# Patient Record
Sex: MALE | Race: BLACK OR AFRICAN AMERICAN
[De-identification: names, ages, dates, MRNs, and addresses within clinical notes are randomized per-mention and may not be internally consistent; named-entity substitution may affect disease eponyms.]

---

## 2017-10-07 NOTE — UC
UC General HPI





- HPI Summary


HPI Summary: 





36 YEAR OLD MALE PRESENTS FOR A REFILE ON HIS PSYCH MEDICATIONS. I WILL SEND 

HIM TO THE ER. 





- History of Current Complaint


Stated Complaint: MED REFILL


Time Seen by Provider: 10/07/17 11:39


Hx Obtained From: Patient


Onset/Duration: Sudden Onset


Timing: Constant


Onset Severity: Moderate


Current Severity: Moderate





- Allergy/Home Medications


Allergies/Adverse Reactions: 


 Allergies











Allergy/AdvReac Type Severity Reaction Status Date / Time


 


No Known Allergies Allergy   Verified 09/23/17 08:58














PMH/Surg Hx/FS Hx/Imm Hx





- Surgical History


Surgical History: None





- Family History


Known Family History: Positive: Hypertension


   Negative: Cardiac Disease, Diabetes





- Social History


Alcohol Use: Occasionally


Substance Use Type: None


Smoking Status (MU): Heavy Every Day Tobacco Smoker


Amount Used/How Often: 1/2 ppd





Review of Systems


Constitutional: Negative


Skin: Negative


Eyes: Negative


ENT: Negative


Respiratory: Negative


Cardiovascular: Negative


Gastrointestinal: Negative


Genitourinary: Negative


Motor: Negative


Neurovascular: Negative


Musculoskeletal: Negative


Neurological: Negative


Psychological: Anxious, Depressed


All Other Systems Reviewed And Are Negative: Yes





Physical Exam


Triage Information Reviewed: Yes


Appearance: Well-Appearing


Vital Signs Reviewed: Yes


Eye Exam: Normal


ENT Exam: Normal


Dental Exam: Normal


Neck exam: Normal


Neck: Positive: 1


Respiratory Exam: Normal


Cardiovascular Exam: Normal


Abdominal Exam: Normal


Musculoskeletal Exam: Normal


Neurological Exam: Normal


Psychological Exam: Normal


Skin Exam: Normal





Course/Dx





- Differential Dx - Multi-Symptom


Provider Diagnoses: PSYCHIATRIC EMOTIONAL ISSUES





Discharge





- Discharge Plan


Condition: Stable


Disposition: HOME


Patient Education Materials:  Medicine Refill (ED)


Referrals: 


No Primary Care Phys,NOPCP [Primary Care Provider] - 


Additional Instructions: 


PLEASE GO TO ER TO REFILL PSYCHIATRIC MEDICATION.

## 2017-10-08 NOTE — ED
Psychiatric Complaint





- HPI Summary


HPI Summary: 





patient presents for medication refill, recently moved to [facility], patient 

was followed in [place] prior, states has an appointment with [facility] 

on thurs, but will run out of medications prior to this, was filled by Dr. Awan at  2 weeks ago, however patient was unable to be seen by  until 

next week.  Denies SI/ HI, has been on current medications for several years.  

  reports recent deakote level  H/O schitzoeffective disorder.   





- History Of Current Complaint


Chief Complaint: EDPrescriptionNeeded


Time Seen by Provider: 10/07/17 17:48


Hx Obtained From: Patient


Onset/Duration: Resolved - with medications


Alleviating Factor(s): Medication





- Allergies/Home Medications


Allergies/Adverse Reactions: 


 Allergies











Allergy/AdvReac Type Severity Reaction Status Date / Time


 


No Known Allergies Allergy   Verified 10/07/17 13:40














PMH/Surg Hx/FS Hx/Imm Hx


Previously Healthy: No - schitzoeffective 


Endocrine/Hematology History: 


   Denies: Hx Diabetes, Hx Thyroid Disease


Cardiovascular History: 


   Denies: Hx Hypertension


Respiratory History: 


   Denies: Hx Asthma, Hx Chronic Obstructive Pulmonary Disease (COPD)


GI History: 


   Denies: Hx Ulcer


Infectious Disease History: No


Infectious Disease History: 


   Denies: Hx Hepatitis, Hx Human Immunodeficiency Virus (HIV), History Other 

Infectious Disease, Traveled Outside the [place] in Last 30 Days





- Family History


Known Family History: Positive: Hypertension


   Negative: Cardiac Disease, Diabetes





- Social History


Alcohol Use: Occasionally


Substance Use Type: Reports: None


Smoking Status (MU): Heavy Every Day Tobacco Smoker


Amount Used/How Often: 1/2 ppd


Have You Smoked in the Last Year: Yes





Review of Systems


Psychological: Normal - controlled on medication 


All Other Systems Reviewed And Are Negative: Yes





Physical Exam


Vital Signs On Initial Exam: 


 Initial Vitals











Temp Pulse Resp BP Pulse Ox


 


 98.1 F   76   20   105/61   100 


 


 10/07/17 13:25  10/07/17 13:25  10/07/17 13:25  10/07/17 13:25  10/07/17 13:25











Vital Signs Reviewed: Yes


Appearance: Positive: Well-Appearing, No Pain Distress, Well-Nourished


Skin: Positive: Warm, Skin Color Reflects Adequate Perfusion


Head/Face: Positive: Normal Head/Face Inspection


Eyes: Positive: Normal, EOMI


Respiratory/Lung Sounds: Positive: Clear to Auscultation, Breath Sounds Present


Cardiovascular: Positive: Normal, RRR, Pulses are Symmetrical in both Upper and 

Lower Extremities, S1, S2


Abdomen Description: Positive: Nontender, No Organomegaly





- Arthur Coma Scale


Coma Scale Total: 15





Diagnostics





- Vital Signs


 Vital Signs











  Temp Pulse Resp BP Pulse Ox


 


 10/07/17 18:10  98.5 F  59  19  109/70  98


 


 10/07/17 17:00  98.7 F  61  18  103/58  100


 


 10/07/17 15:09  98.2 F  74  20  107/62  97


 


 10/07/17 13:25  98.1 F  76  20  105/61  100














- Laboratory


Lab Results: 


 Lab Results











  10/07/17 Range/Units





  18:02 


 


Valproic Acid  73.0  ()  mcg/mL











Lab Statement: Any lab studies that have been ordered have been reviewed, and 

results considered in the medical decision making process.





Course/Dx





- Course


Course Of Treatment: discussed with patient that we will no longer be able to 

fill his  medications, referral to PCP given, follow up with [facility] for 

presctriptions.  patient in agreement





- Differential Dx/Clinical Impression


Differential Diagnosis/HQI/PQRI: Positive: Other - med refill, no complaints


Provider Diagnosis: 


 Medication refill, Schizo affective schizophrenia








Discharge





- Discharge Plan


Condition: Good


Disposition: HOME


Prescriptions: 


Divalproex DR TAB(*) [Depakote DR TAB(*)] 500 mg PO BID #28 tab.


risperiDONE TAB* [Risperdal*] 1 mg PO DAILY #14 tab


risperiDONE TAB* [Risperdal*] 2 mg PO BEDTIME #14 tab


Patient Education Materials:  Risperidone (By mouth), Valproic Acid (By mouth)


Referrals: 


No Primary Care Phys,NOPCP [Primary Care Provider] - 


Additional Instructions: 


- Please follow up with [facility] on THurs for you appointment.  

We will NOT be able to refill any other medications for you 





- REturn to ER with thoughts of wanting to hurt yourself, others, increased 

pain. lightheadedness, dizziness or shortness of breath

## 2018-01-11 ENCOUNTER — HOSPITAL ENCOUNTER (EMERGENCY)
Dept: HOSPITAL 25 - ED | Age: 38
LOS: 1 days | Discharge: HOME | End: 2018-01-12
Payer: MEDICAID

## 2018-01-11 DIAGNOSIS — R07.9: ICD-10-CM

## 2018-01-11 DIAGNOSIS — F32.9: ICD-10-CM

## 2018-01-11 DIAGNOSIS — F17.210: ICD-10-CM

## 2018-01-11 DIAGNOSIS — F41.9: Primary | ICD-10-CM

## 2018-01-11 PROCEDURE — 80053 COMPREHEN METABOLIC PANEL: CPT

## 2018-01-11 PROCEDURE — 36415 COLL VENOUS BLD VENIPUNCTURE: CPT

## 2018-01-11 PROCEDURE — 83605 ASSAY OF LACTIC ACID: CPT

## 2018-01-11 PROCEDURE — 85027 COMPLETE CBC AUTOMATED: CPT

## 2018-01-11 PROCEDURE — 99283 EMERGENCY DEPT VISIT LOW MDM: CPT

## 2018-01-11 PROCEDURE — 71046 X-RAY EXAM CHEST 2 VIEWS: CPT

## 2018-01-11 PROCEDURE — 84484 ASSAY OF TROPONIN QUANT: CPT

## 2018-01-11 PROCEDURE — 93005 ELECTROCARDIOGRAM TRACING: CPT

## 2018-01-12 VITALS — SYSTOLIC BLOOD PRESSURE: 166 MMHG | DIASTOLIC BLOOD PRESSURE: 69 MMHG

## 2018-01-12 LAB
HCT VFR BLD AUTO: 43 % (ref 42–52)
HGB BLD-MCNC: 14.5 G/DL (ref 14–18)
MCH RBC QN AUTO: 32 PG (ref 27–31)
MCHC RBC AUTO-ENTMCNC: 34 G/DL (ref 31–36)
MCV RBC AUTO: 93 FL (ref 80–94)
PLATELET # BLD AUTO: 194 10^3/UL (ref 150–450)
RBC # BLD AUTO: 4.57 10^6/UL (ref 4–5.4)
WBC # BLD AUTO: 5.8 10^3/UL (ref 3.5–10.8)
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
## 2018-10-03 ENCOUNTER — HOSPITAL ENCOUNTER (INPATIENT)
Dept: HOSPITAL 25 - ED | Age: 38
LOS: 7 days | Discharge: HOME | DRG: 750 | End: 2018-10-10
Attending: PSYCHIATRY & NEUROLOGY | Admitting: PSYCHIATRY & NEUROLOGY
Payer: MEDICAID

## 2018-10-03 DIAGNOSIS — F41.9: ICD-10-CM

## 2018-10-03 DIAGNOSIS — F22: ICD-10-CM
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
## 2018-10-03 DIAGNOSIS — F39: ICD-10-CM

## 2018-10-03 DIAGNOSIS — R45.851: ICD-10-CM

## 2018-10-03 DIAGNOSIS — F25.8: Primary | ICD-10-CM

## 2018-10-03 DIAGNOSIS — F32.9: ICD-10-CM

## 2018-10-03 DIAGNOSIS — Z82.49: ICD-10-CM

## 2018-10-03 DIAGNOSIS — G25.9: ICD-10-CM

## 2018-10-03 DIAGNOSIS — Z81.8: ICD-10-CM

## 2018-10-03 DIAGNOSIS — F10.10: ICD-10-CM

## 2018-10-03 DIAGNOSIS — F14.10: ICD-10-CM

## 2018-10-03 DIAGNOSIS — F17.200: ICD-10-CM

## 2018-10-03 DIAGNOSIS — Z56.0: ICD-10-CM

## 2018-10-03 LAB
BASOPHILS # BLD AUTO: 0.1 10^3/UL (ref 0–0.2)
EOSINOPHIL # BLD AUTO: 0.1 10^3/UL (ref 0–0.6)
HCT VFR BLD AUTO: 45 % (ref 42–52)
HGB BLD-MCNC: 15.1 G/DL (ref 14–18)
LYMPHOCYTES # BLD AUTO: 1.9 10^3/UL (ref 1–4.8)
MCH RBC QN AUTO: 32 PG (ref 27–31)
MCHC RBC AUTO-ENTMCNC: 34 G/DL (ref 31–36)
MCV RBC AUTO: 94 FL (ref 80–94)
MONOCYTES # BLD AUTO: 1.1 10^3/UL (ref 0–0.8)
NEUTROPHILS # BLD AUTO: 8.6 10^3/UL (ref 1.5–7.7)
NRBC # BLD AUTO: 0 10^3/UL
NRBC BLD QL AUTO: 0.2
PLATELET # BLD AUTO: 224 10^3/UL (ref 150–450)
RBC # BLD AUTO: 4.8 10^6/UL (ref 4–5.4)
RBC UR QL AUTO: (no result)
WBC # BLD AUTO: 11.7 10^3/UL (ref 3.5–10.8)
WBC UR QL AUTO: (no result)

## 2018-10-03 PROCEDURE — 87086 URINE CULTURE/COLONY COUNT: CPT

## 2018-10-03 PROCEDURE — 99238 HOSP IP/OBS DSCHRG MGMT 30/<: CPT

## 2018-10-03 PROCEDURE — 80053 COMPREHEN METABOLIC PANEL: CPT

## 2018-10-03 PROCEDURE — 80061 LIPID PANEL: CPT

## 2018-10-03 PROCEDURE — 85025 COMPLETE CBC W/AUTO DIFF WBC: CPT

## 2018-10-03 PROCEDURE — 36415 COLL VENOUS BLD VENIPUNCTURE: CPT

## 2018-10-03 PROCEDURE — 81015 MICROSCOPIC EXAM OF URINE: CPT

## 2018-10-03 PROCEDURE — 80164 ASSAY DIPROPYLACETIC ACD TOT: CPT

## 2018-10-03 PROCEDURE — 81003 URINALYSIS AUTO W/O SCOPE: CPT

## 2018-10-03 PROCEDURE — 80320 DRUG SCREEN QUANTALCOHOLS: CPT

## 2018-10-03 PROCEDURE — 84443 ASSAY THYROID STIM HORMONE: CPT

## 2018-10-03 PROCEDURE — 80329 ANALGESICS NON-OPIOID 1 OR 2: CPT

## 2018-10-03 PROCEDURE — G0480 DRUG TEST DEF 1-7 CLASSES: HCPCS

## 2018-10-03 PROCEDURE — 99222 1ST HOSP IP/OBS MODERATE 55: CPT

## 2018-10-03 PROCEDURE — 99283 EMERGENCY DEPT VISIT LOW MDM: CPT

## 2018-10-03 PROCEDURE — 80307 DRUG TEST PRSMV CHEM ANLYZR: CPT

## 2018-10-03 PROCEDURE — 83036 HEMOGLOBIN GLYCOSYLATED A1C: CPT

## 2018-10-03 PROCEDURE — 99231 SBSQ HOSP IP/OBS SF/LOW 25: CPT

## 2018-10-03 RX ADMIN — RISPERIDONE SCH MG: 2 TABLET ORAL at 21:13

## 2018-10-03 RX ADMIN — DIVALPROEX SODIUM SCH MG: 500 TABLET, DELAYED RELEASE ORAL at 21:13

## 2018-10-03 SDOH — ECONOMIC STABILITY - INCOME SECURITY: UNEMPLOYMENT, UNSPECIFIED: Z56.0

## 2018-10-03 NOTE — ED
Progress





- Progress Note


Progress Note: 


Receiving sign out from Dr. Mark. Patient is still pending transfer. He was 

moved to the FLEX unit. Signing out to Dr. Mark, pending transfer.








Course/Dx





- Diagnoses


Provider Diagnoses: 


 Substance-induced psychotic disorder








Discharge





- Sign-Out/Discharge


Documenting (check all that apply): Sign-Out Patient, Receiving Sign-Out


Signing out patient TO: Edmundo Mark


Receiving patient FROM: Edmundo Mark





- Discharge Plan


Referrals: 


No Primary Care Phys,NOPCP [Primary Care Provider] - 





- Attestation Statements


Document Initiated by Scribe: Yes


Documenting Scribe: Amy Ricci


Provider For Whom Scribe is Documenting (Include Credential): Brooks Mar MD


Scribe Attestation: 


Amy BAZAN, scribed for Brooks Mar MD on 10/03/18 at 1835.

## 2018-10-03 NOTE — ED
Progress





- Progress Note


Progress Note: 


Receiving sign out from Dr. Mar. Patient is still pending transfer. 





Patient will be signed out to Dr. Mar, pending transfer





- Consult/PCP


Time Called: 04:55





Course/Dx





- Provider Notifications


Time Discussed With Above Provider: 05:56


Reason For Transfer: No beds available.





Discharge





- Sign-Out/Discharge


Documenting (check all that apply): Sign-Out Patient, Receiving Sign-Out


Signing out patient TO: Brooks Mar


Receiving patient FROM: Brooks Mar





- Discharge Plan


Referrals: 


No Primary Care Phys,NOPCP [Primary Care Provider] - 





- Attestation Statements


Document Initiated by Scribe: Yes


Documenting Scribe: Ramon Garcia


Provider For Whom Scribe is Documenting (Include Credential): Edmundo Mark MD


Scribe Attestation: 


Ramon BAZAN, scribed for Edmundo Mark MD on 10/04/18 at 0646.

## 2018-10-03 NOTE — PN
ED Flex Patient Progress Note


Date of Service: 10/03/18


Subjective:  





ED Flex Day #1 for this 37 y.o. AA male with a history of polysubstance misuse (

cocaine, cannabis, ETOH) arrives from [place] as itinerant with multiple 

packed suitcases, seeking psychiatric hospitalization for disorganized 

behavior.  UDS positive for above substances.





Objective: 





young, AA male in patient scrubs; symptoms of thought disorganization seem 

feigned/exaggerated 





Assessment:


 


Cocaine Use Disorder





Plan: 





Patient seems to be embellishing symptoms.  Will hold overnight in the Flex and 

re-eval in the AM once more detoxified.  Renew Depakote and risperidone per 

outpatient regimen.


 Vital Signs











Temp Pulse Resp BP Pulse Ox


 


 99.2 F   73   15   90/60   100 


 


 10/03/18 10:00  10/03/18 10:00  10/03/18 10:00  10/03/18 10:00  10/03/18 10:00











 Lab Results - Entire Visit











  10/03/18 10/03/18 10/03/18





  01:48 01:48 01:21


 


WBC   


 


RBC   


 


Hgb   


 


Hct   


 


MCV   


 


MCH   


 


MCHC   


 


RDW   


 


Plt Count   


 


MPV   


 


Neut % (Auto)   


 


Lymph % (Auto)   


 


Mono % (Auto)   


 


Eos % (Auto)   


 


Baso % (Auto)   


 


Absolute Neuts (auto)   


 


Absolute Lymphs (auto)   


 


Absolute Monos (auto)   


 


Absolute Eos (auto)   


 


Absolute Basos (auto)   


 


Absolute Nucleated RBC   


 


Nucleated RBC %   


 


Sodium    138


 


Potassium    3.7


 


Chloride    100 L


 


Carbon Dioxide    27


 


Anion Gap    11


 


BUN    14


 


Creatinine    1.34 H


 


Est GFR ( Amer)    73.4


 


Est GFR (Non-Af Amer)    60.7


 


BUN/Creatinine Ratio    10.4


 


Glucose    58 L


 


Calcium    9.4


 


Total Bilirubin    0.50


 


AST    45 H


 


ALT    31


 


Alkaline Phosphatase    61


 


Total Protein    7.8


 


Albumin    4.7


 


Globulin    3.1


 


Albumin/Globulin Ratio    1.5


 


TSH    0.49


 


Urine Color   Yellow 


 


Urine Appearance   Cloudy 


 


Urine pH   5.0 


 


Ur Specific Gravity   1.015 


 


Urine Protein   Negative 


 


Urine Ketones   Negative 


 


Urine Blood   1+ A 


 


Urine Nitrate   Negative 


 


Urine Bilirubin   Negative 


 


Urine Urobilinogen   Negative 


 


Ur Leukocyte Esterase   Negative 


 


Urine WBC (Auto)   Trace(0-5/hpf) 


 


Urine RBC (Auto)   Trace(0-2/hpf) 


 


Ur Squamous Epith Cells   Present A 


 


Urine Bacteria   Absent 


 


Urine Glucose   Negative 


 


Salicylates    < 2.50


 


Urine Opiates Screen  None detected  


 


Acetaminophen    < 15


 


Ur Barbiturates Screen  None detected  


 


Valproic Acid    < 13.0 L


 


Ur Phencyclidine Scrn  None detected  


 


Ur Amphetamines Screen  None detected  


 


U Benzodiazepines Scrn  None detected  


 


Urine Cocaine Screen  Presumptive positive A  


 


U Cannabinoids Screen  Presumptive positive A  


 


Serum Alcohol    157 H














  10/03/18





  01:21


 


WBC  11.7 H


 


RBC  4.80


 


Hgb  15.1


 


Hct  45


 


MCV  94


 


MCH  32 H


 


MCHC  34


 


RDW  14


 


Plt Count  224


 


MPV  8.7


 


Neut % (Auto)  73.6


 


Lymph % (Auto)  16.3 L


 


Mono % (Auto)  9.0 H


 


Eos % (Auto)  0.6


 


Baso % (Auto)  0.5


 


Absolute Neuts (auto)  8.6 H


 


Absolute Lymphs (auto)  1.9


 


Absolute Monos (auto)  1.1 H


 


Absolute Eos (auto)  0.1


 


Absolute Basos (auto)  0.1


 


Absolute Nucleated RBC  0


 


Nucleated RBC %  0.2


 


Sodium 


 


Potassium 


 


Chloride 


 


Carbon Dioxide 


 


Anion Gap 


 


BUN 


 


Creatinine 


 


Est GFR ( Amer) 


 


Est GFR (Non-Af Amer) 


 


BUN/Creatinine Ratio 


 


Glucose 


 


Calcium 


 


Total Bilirubin 


 


AST 


 


ALT 


 


Alkaline Phosphatase 


 


Total Protein 


 


Albumin 


 


Globulin 


 


Albumin/Globulin Ratio 


 


TSH 


 


Urine Color 


 


Urine Appearance 


 


Urine pH 


 


Ur Specific Gravity 


 


Urine Protein 


 


Urine Ketones 


 


Urine Blood 


 


Urine Nitrate 


 


Urine Bilirubin 


 


Urine Urobilinogen 


 


Ur Leukocyte Esterase 


 


Urine WBC (Auto) 


 


Urine RBC (Auto) 


 


Ur Squamous Epith Cells 


 


Urine Bacteria 


 


Urine Glucose 


 


Salicylates 


 


Urine Opiates Screen 


 


Acetaminophen 


 


Ur Barbiturates Screen 


 


Valproic Acid 


 


Ur Phencyclidine Scrn 


 


Ur Amphetamines Screen 


 


U Benzodiazepines Scrn 


 


Urine Cocaine Screen 


 


U Cannabinoids Screen 


 


Serum Alcohol

## 2018-10-03 NOTE — ED
Psychiatric Complaint





- HPI Summary


HPI Summary: 


This patient is a 37 year old M presenting to [facility] with a chief complaint 

of SI with no plan. He endorses paranoia, feeling unsafe outside, and using 

crack cocaine. Rx Depakote, risperidone, and sertraline. Pt states wants tx. Pt 

notes he called the police himself due to feeling unsafe.





- History Of Current Complaint


Chief Complaint: EDMentalHealth


Time Seen by Provider: 10/03/18 00:46


Hx Obtained From: Patient


Onset/Duration: Sudden Onset, Still Present


Timing: Constant


Severity Initially: Moderate


Severity Currently: Moderate


Character: Depressed, Fearful, Anxious


Aggravating Factor(s): Drug Use - crack cocaine


Alleviating Factor(s): Nothing


Associated Signs And Symptoms: Positive: Paranoid Behavior


Related History: Positive For: Prior Psychiatric Issues


Has Suicidal: Reports: Thoughts.  Denies: With A Plan


Has Homicidal: Denies: Thoughts


Ingestion History: Type/Name Of Drug - crack





- Allergies/Home Medications


Allergies/Adverse Reactions: 


 Allergies











Allergy/AdvReac Type Severity Reaction Status Date / Time


 


No Known Allergies Allergy   Verified 10/07/17 13:40














PMH/Surg Hx/FS Hx/Imm Hx


Endocrine/Hematology History: 


   Denies: Hx Diabetes, Hx Thyroid Disease


Cardiovascular History: 


   Denies: Hx Hypertension


Respiratory History: 


   Denies: Hx Asthma, Hx Chronic Obstructive Pulmonary Disease (COPD)


GI History: 


   Denies: Hx Ulcer


 History: 


   Denies: Hx Dialysis


Musculoskeletal History: 


   Denies: Hx Osteoporosis


Sensory History: 


   Denies: Hx Legally Blind, Hx Deafness


Opthamlomology History: 


   Denies: Hx Legally Blind


EENT History: 


   Denies: Hx Deafness


Psychiatric History: Reports: Hx Anxiety, Hx Depression


Infectious Disease History: No


Infectious Disease History: 


   Denies: Hx Hepatitis, Hx Human Immunodeficiency Virus (HIV), History Other 

Infectious Disease, Traveled Outside the [place] in Last 30 Days





- Family History


Known Family History: Positive: Hypertension


   Negative: Cardiac Disease, Diabetes





- Social History


Alcohol Use: Occasionally


Hx Substance Use: No


Substance Use Type: Reports: None


Hx Tobacco Use: Yes


Smoking Status (MU): Heavy Every Day Tobacco Smoker


Amount Used/How Often: 1/2 ppd


Have You Smoked in the Last Year: Yes





Review of Systems


Negative: Fever


Positive: no symptoms reported


Positive: Other - paranoia, SI


All Other Systems Reviewed And Are Negative: Yes





Physical Exam





- Summary


Physical Exam Summary: 


VITAL SIGNS: Reviewed.


GENERAL:  Patient is a well-developed and nourished male who is lying 

comfortable in the stretcher. Patient is not in any acute respiratory distress.


HEAD AND FACE: No signs of trauma. No ecchymosis, hematomas or skull 

depressions. No sinus tenderness.


EYES: PERRLA, EOMI x 2, No injected conjunctiva, no nystagmus.


EARS: Hearing grossly intact. Ear canals and tympanic membranes are within 

normal limits.


MOUTH: Oropharynx within normal limits.


NECK: Supple, trachea is midline, no adenopathy, no JVD, no carotid bruit, no c-

spine tenderness, neck with full ROM.


CHEST: Symmetric, no tenderness at palpation


LUNGS: Clear to auscultation bilaterally. No wheezing or crackles.


CVS: Regular rate and rhythm, S1 and S2 present, no murmurs or gallops 

appreciated.


ABDOMEN: Soft, non-tender. No signs of distention. No rebound no guarding, and 

no masses palpated. Bowel sounds are normal.


EXTREMITIES: FROM in all major joints, no edema, no cyanosis or clubbing.


NEURO: Alert and oriented x 3. No acute neurological deficits. Speech is normal 

and follows commands.


SKIN: Dry and warm


PSYCH: Talkative, impulsive, paranoid, endorses SI with no plan, wants drug tx.








Triage Information Reviewed: Yes


Vital Signs On Initial Exam: 


 Initial Vitals











Temp Pulse Resp BP Pulse Ox


 


 97.9 F   90   18   108/68   97 


 


 10/03/18 00:17  10/03/18 00:17  10/03/18 00:17  10/03/18 00:17  10/03/18 00:17











Vital Signs Reviewed: Yes





Diagnostics





- Vital Signs


 Vital Signs











  Temp Pulse Resp BP Pulse Ox


 


 10/03/18 00:17  97.9 F  90  18  108/68  97














- Laboratory


Result Diagrams: 


 10/03/18 01:21





 10/03/18 01:21


Lab Statement: Any lab studies that have been ordered have been reviewed, and 

results considered in the medical decision making process.





Course/Dx





- Course


Course Of Treatment: A 37-year-old M presents to the ED with a CC of SI with no 

plan. (+) paranoia, fear for his well-being. Rx sertraline, depakote, 

risperidone. Pt called the police himself. In the ED course, pt was given .





- Differential Dx/Clinical Impression


Provider Diagnosis: 


 Substance-induced psychotic disorder








- Physician Notifications


Discussed Care Of Patient With: Luda Alba


Time Discussed With Above Provider: 05:56


Instructed by Provider To: Other - per david burr, recommends transfer, 

involuntary status, with dx of substance induced psychosis


Reason For Transfer: No beds available.





Discharge





- Sign-Out/Discharge


Documenting (check all that apply): Sign-Out Patient


Signing out patient TO: Brooks Thomas  transfer





- Discharge Plan


Referrals: 


No Primary Care Phys,NOPCP [Primary Care Provider] - 





- Attestation Statements


Document Initiated by Scribe: Yes


Documenting Scribe: Matthew Olivares


Provider For Whom Scribe is Documenting (Include Credential): Dr. Edmundo Mark MD


Scribe Attestation: 


Matthew BAZAN, scribed for Dr. Edmundo Mark MD on 10/03/18 at 0556.

## 2018-10-04 RX ADMIN — RISPERIDONE SCH MG: 2 TABLET ORAL at 21:32

## 2018-10-04 RX ADMIN — DIVALPROEX SODIUM SCH MG: 500 TABLET, DELAYED RELEASE ORAL at 21:32

## 2018-10-04 RX ADMIN — DIVALPROEX SODIUM SCH MG: 500 TABLET, DELAYED RELEASE ORAL at 09:48

## 2018-10-04 NOTE — PN
ED Flex Patient Progress Note


Date of Service: 10/04/18


Subjective:


  


ED Flex Day #2 for this 37 y.o. AA male with a history of polysubstance misuse (

cocaine, cannabis, ETOH) arrives from [place] as itinerant with multiple 

packed suitcases, seeking psychiatric hospitalization for disorganized 

behavior.  UDS positive for above substances.  Collateral information from 

previous admission at [facility] substantiates primary mental health condition 

for which he has had multiple admissions in [place] and [place].  Patient still 

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 

steal money from.





Objective: 





young, AA male in patient scrubs; symptoms of thought disorganization, 

grandiosity, paranoia 





Assessment:


 


Unspecified Psychotic DO





Plan: 





Admit to [facility] on involuntary 9.39 status.  Continue risperidone and Depakote.


 Vital Signs











Temp Pulse Resp BP Pulse Ox


 


 100.2 F   77   16   115/68   100 


 


 10/03/18 19:45  10/03/18 19:45  10/03/18 19:45  10/03/18 19:45  10/03/18 19:45











 Lab Results - Entire Visit











  10/03/18 10/03/18 10/03/18





  01:48 01:48 01:21


 


WBC   


 


RBC   


 


Hgb   


 


Hct   


 


MCV   


 


MCH   


 


MCHC   


 


RDW   


 


Plt Count   


 


MPV   


 


Neut % (Auto)   


 


Lymph % (Auto)   


 


Mono % (Auto)   


 


Eos % (Auto)   


 


Baso % (Auto)   


 


Absolute Neuts (auto)   


 


Absolute Lymphs (auto)   


 


Absolute Monos (auto)   


 


Absolute Eos (auto)   


 


Absolute Basos (auto)   


 


Absolute Nucleated RBC   


 


Nucleated RBC %   


 


Sodium    138


 


Potassium    3.7


 


Chloride    100 L


 


Carbon Dioxide    27


 


Anion Gap    11


 


BUN    14


 


Creatinine    1.34 H


 


Est GFR ( Amer)    73.4


 


Est GFR (Non-Af Amer)    60.7


 


BUN/Creatinine Ratio    10.4


 


Glucose    58 L


 


Calcium    9.4


 


Total Bilirubin    0.50


 


AST    45 H


 


ALT    31


 


Alkaline Phosphatase    61


 


Total Protein    7.8


 


Albumin    4.7


 


Globulin    3.1


 


Albumin/Globulin Ratio    1.5


 


TSH    0.49


 


Urine Color   Yellow 


 


Urine Appearance   Cloudy 


 


Urine pH   5.0 


 


Ur Specific Gravity   1.015 


 


Urine Protein   Negative 


 


Urine Ketones   Negative 


 


Urine Blood   1+ A 


 


Urine Nitrate   Negative 


 


Urine Bilirubin   Negative 


 


Urine Urobilinogen   Negative 


 


Ur Leukocyte Esterase   Negative 


 


Urine WBC (Auto)   Trace(0-5/hpf) 


 


Urine RBC (Auto)   Trace(0-2/hpf) 


 


Ur Squamous Epith Cells   Present A 


 


Urine Bacteria   Absent 


 


Urine Glucose   Negative 


 


Salicylates    < 2.50


 


Urine Opiates Screen  None detected  


 


Acetaminophen    < 15


 


Ur Barbiturates Screen  None detected  


 


Valproic Acid    < 13.0 L


 


Ur Phencyclidine Scrn  None detected  


 


Ur Amphetamines Screen  None detected  


 


U Benzodiazepines Scrn  None detected  


 


Urine Cocaine Screen  Presumptive positive A  


 


U Cannabinoids Screen  Presumptive positive A  


 


Serum Alcohol    157 H














  10/03/18





  01:21


 


WBC  11.7 H


 


RBC  4.80


 


Hgb  15.1


 


Hct  45


 


MCV  94


 


MCH  32 H


 


MCHC  34


 


RDW  14


 


Plt Count  224


 


MPV  8.7


 


Neut % (Auto)  73.6


 


Lymph % (Auto)  16.3 L


 


Mono % (Auto)  9.0 H


 


Eos % (Auto)  0.6


 


Baso % (Auto)  0.5


 


Absolute Neuts (auto)  8.6 H


 


Absolute Lymphs (auto)  1.9


 


Absolute Monos (auto)  1.1 H


 


Absolute Eos (auto)  0.1


 


Absolute Basos (auto)  0.1


 


Absolute Nucleated RBC  0


 


Nucleated RBC %  0.2


 


Sodium 


 


Potassium 


 


Chloride 


 


Carbon Dioxide 


 


Anion Gap 


 


BUN 


 


Creatinine 


 


Est GFR ( Amer) 


 


Est GFR (Non-Af Amer) 


 


BUN/Creatinine Ratio 


 


Glucose 


 


Calcium 


 


Total Bilirubin 


 


AST 


 


ALT 


 


Alkaline Phosphatase 


 


Total Protein 


 


Albumin 


 


Globulin 


 


Albumin/Globulin Ratio 


 


TSH 


 


Urine Color 


 


Urine Appearance 


 


Urine pH 


 


Ur Specific Gravity 


 


Urine Protein 


 


Urine Ketones 


 


Urine Blood 


 


Urine Nitrate 


 


Urine Bilirubin 


 


Urine Urobilinogen 


 


Ur Leukocyte Esterase 


 


Urine WBC (Auto) 


 


Urine RBC (Auto) 


 


Ur Squamous Epith Cells 


 


Urine Bacteria 


 


Urine Glucose 


 


Salicylates 


 


Urine Opiates Screen 


 


Acetaminophen 


 


Ur Barbiturates Screen 


 


Valproic Acid 


 


Ur Phencyclidine Scrn 


 


Ur Amphetamines Screen 


 


U Benzodiazepines Scrn 


 


Urine Cocaine Screen 


 


U Cannabinoids Screen 


 


Serum Alcohol

## 2018-10-04 NOTE — ED
Progress





- Progress Note


Progress Note: 





This pt was signed out by Dr. Mark, pending disposition. 





Pt had a mental health evaluation and his case was reviewed by Dr. Bruno, 

psychiatrist. Dr. Bruno will admit the pt involuntary to [facility] [place] 

psychosis. 





Course/Dx





- Diagnoses


Provider Diagnoses: 


 Psychosis








Discharge





- Sign-Out/Discharge


Documenting (check all that apply): Patient Departure - Admit to [facility]





- Discharge Plan


Condition: Stable


Disposition: PSYCHIATRIC FACILITY[place]


Referrals: 


No Primary Care Phys,NOPCP [Primary Care Provider] - 





- Attestation Statements


Document Initiated by Scribe: Yes


Documenting Scribe: Kimmy Wright


Provider For Whom Scribe is Documenting (Include Credential): Brooks Mar MD


Scribe Attestation: 


Kimmy BAZAN, scribed for Brooks Mar MD on 10/04/18 at 1218.

## 2018-10-05 RX ADMIN — RISPERIDONE SCH MG: 2 TABLET ORAL at 20:37

## 2018-10-05 RX ADMIN — DIVALPROEX SODIUM SCH MG: 500 TABLET, DELAYED RELEASE ORAL at 20:35

## 2018-10-05 RX ADMIN — DIPHENHYDRAMINE HYDROCHLORIDE PRN MG: 50 CAPSULE ORAL at 20:37

## 2018-10-05 RX ADMIN — DIVALPROEX SODIUM SCH MG: 500 TABLET, DELAYED RELEASE ORAL at 09:04
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
## 2018-10-06 RX ADMIN — RISPERIDONE SCH MG: 2 TABLET ORAL at 21:07

## 2018-10-06 RX ADMIN — DIPHENHYDRAMINE HYDROCHLORIDE PRN MG: 50 CAPSULE ORAL at 21:07

## 2018-10-06 RX ADMIN — DIVALPROEX SODIUM SCH MG: 500 TABLET, DELAYED RELEASE ORAL at 09:07

## 2018-10-06 RX ADMIN — DIVALPROEX SODIUM SCH MG: 500 TABLET, DELAYED RELEASE ORAL at 21:07

## 2018-10-07 RX ADMIN — DIPHENHYDRAMINE HYDROCHLORIDE PRN MG: 50 CAPSULE ORAL at 23:11

## 2018-10-07 RX ADMIN — DIVALPROEX SODIUM SCH MG: 500 TABLET, DELAYED RELEASE ORAL at 23:11

## 2018-10-07 RX ADMIN — DIVALPROEX SODIUM SCH MG: 500 TABLET, DELAYED RELEASE ORAL at 09:18

## 2018-10-07 RX ADMIN — RISPERIDONE SCH MG: 2 TABLET ORAL at 23:11

## 2018-10-07 NOTE — PN
Subjective





- Subjective


Date of Service: 10/07/18


Service Type: 85963 Hosp care 15 min low complexity


Subjective: 





Waqas was in bed and says he was good to go home. Denies mood, thought or 

perceptual problems any more. Also denies SI or HI. Has hard time understanding 

his substance use problems and smiles inappropriately during the conversation. 

Evidently his psychosis was due to substance use.





Objective





- Appearance


Appearance: Healthy Appearing, Thin Framed


Dysmorphic Features: No


Hygiene: Normal


Grooming: Disheveled





- Behavior


Psychomotor Activities: Normal


Exhibits Abnormal Movement: No





- Attitude and Relatedness


Attitude and Relatedness: Cooperative


Eye Contact: Fair





- Speech


Quality: Unpressured


Latencies: Normal


Quantity: Appropriate





- Mood


Patient's Decription of Mood: "Fine"





- Affect


Observed Affect: Non-labile


Affect Consistent with: Euthymia





- Thought Process


Patient's Thought Process: Coherent, Goal Directed


Thought Content: No Passive Death Wish, No Suicidal Planning, No Homicidal 

Ideation, No Paranoid Ideation





- Sensorium


Experiencing Hallucinations: No, Sensorium is Clear


Type of Hallucinations: Visual: No, Auditory: No, Command: No





- Level of Consciousness


Level of Consciousness: Alert


Orientation: Yes Intact, Yes Orientated to Time, Yes Orientated to Place, Yes 

Orientated to Person





- Impulse Control


Impulse Control: Intact





- Insight and Judgement


Insight and Judgement: Poor





- Group Participation


Particating in Group Activities: No





- Medication Management


Medication Management Adherence: Yes





Assessment





- Assessment


Merits Inpatient Hospitalization: [facility] Improvements, For Discharge 

Planning


Clinical Impression: 





Psychiatrically stable and not a risk to self or others. His symptoms were due 

to drug use.





Plan





- Plan


Treatment Plan: 


Name: ALTAGRACIA VUONG JR                        


YOB: 1980                        


[id number]


[id number]











Continued Medication Management: Continue Outpt Medication


Medications: 


 Current Medications





Acetaminophen (Tylenol Tab*)  650 mg PO Q4H PRN


   PRN Reason: for pain; or Temp >101 F


Al Hydrox/Mg Hydrox/Simethicone (Maalox Plus*)  30 ml PO Q4H PRN


   PRN Reason: INDIGESTION


Diphenhydramine HCl (Benadryl Po*)  50 mg PO Q6H PRN


   PRN Reason: Anxiety/EPS


   Last Admin: 10/06/18 21:07 Dose:  50 mg


Divalproex Sodium (Depakote Dr Tab(*))  500 mg PO BID [facility]


   Last Admin: 10/07/18 09:18 Dose:  500 mg


Haloperidol (Haldol Tab*)  5 mg PO Q6H PRN


   PRN Reason: AGITATION


Lorazepam (Ativan Tab(*))  1 mg PO Q6H PRN


   PRN Reason: alcohol withdrawal


Risperidone (Risperdal*)  2 mg PO BEDTIME [facility]


   Last Admin: 10/06/18 21:07 Dose:  2 mg











- Discharge Plan


Discharge Plan: Drug/Alcohol Rehab

## 2018-10-08 RX ADMIN — DIVALPROEX SODIUM SCH MG: 500 TABLET, DELAYED RELEASE ORAL at 22:42

## 2018-10-08 RX ADMIN — DIVALPROEX SODIUM SCH MG: 500 TABLET, DELAYED RELEASE ORAL at 09:16

## 2018-10-08 RX ADMIN — RISPERIDONE SCH MG: 2 TABLET ORAL at 22:42

## 2018-10-08 NOTE — PN
Subjective





- Subjective


Date of Service: 10/08/18


Service Type: 22128 Hosp care 15 min low complexity


Subjective: 





Patient was seen by self, discussed with treatment team, chart was reviewed. 

Patient has been compliant with his medications, no reported side effects. 

Patient reports improvement in his symptoms of paranoia and less religiously 

preoccupied and more organized with his thinking and behavior. Patient sleeping 

has been improving as well. Patient eating has been fair. Patient has been 

cooperative with staff. Patient behavior has been in control. Patient mood was 

less anxious and improving in racing thoughts but still preoccupied with his 

court date. Patient has been reporting no suicidal or homicidal ideation. No 

psychotic symptoms of hallucinations but do have some magical thinking and 

stated that I saw that vision before hospitalization and things are working 

well for me now.





Objective





- Appearance


Appearance: Healthy Appearing


Dysmorphic Features: No


Hygiene: Normal


Grooming: Fairly Well Kept





- Behavior


Psychomotor Activities: Normal


Exhibits Abnormal Movement: No





- Attitude and Relatedness


Attitude and Relatedness: Cooperative


Eye Contact: Fair





- Speech


Quality: Unpressured


Latencies: Normal


Quantity: Terse





- Mood


Patient's Decription of Mood: "Anxious"





- Affect


Observed Affect: Tense


Affect Consistent with: Dysphoria





- Thought Process


Patient's Thought Process: Coherent, Goal Directed


Thought Content: No Passive Death Wish, No Suicidal Planning, No Homicidal 

Ideation, No Paranoid Ideation





- Sensorium


Experiencing Hallucinations: No, Sensorium is Clear


Type of Hallucinations: Visual: No, Auditory: No, Command: No





- Level of Consciousness


Level of Consciousness: Alert


Orientation: Yes Intact, Yes Orientated to Time, Yes Orientated to Place, Yes 

Orientated to Person





- Impulse Control


Impulse Control: Intact





- Insight and Judgement


Insight and Judgement: Fair





- Group Participation


Particating in Group Activities: Yes





- Medication Management


Medication Management Adherence: Yes





Assessment





- Assessment


Merits Inpatient Hospitalization: For Immediate Safety, For Stabilization, For 

Discharge Planning


Inpatient DSM-V Dx: F25.8


Clinical Impression: 





Patient with history of Schizoaffective Disorder and Substance Abuse. Patient 

currently admitted due to worsening of psychosis and mood instability with 

disorganized behavior. Patient has also struggled with substance abuse and 

consuming cannabis and alcohol on a daily basis and was positive for cocaine in 

his urine. Patient is a danger to self and others if discharged hence will be 

stabilized on inpatient unit with medication adjustments and therapy.





Plan





- Plan


Treatment Plan: 


Name: ALTAGRACIA VUONG JR                        


YOB: 1980                        


[id number]


[id number]








- Patient continues to be hospitalized due to recent psychosis, mood instability

, anxiety and disorganized behavior.


- Patient's medications were continued and will be observed.


- Patient will be monitored for improvement and side effects. Risk and benefits 

were discussed.


- Patient was encouraged to continue his participation in the milieu, group and 

individual therapy.


Medications: 


 Current Medications





Acetaminophen (Tylenol Tab*)  650 mg PO Q4H PRN


   PRN Reason: for pain; or Temp >101 F


Al Hydrox/Mg Hydrox/Simethicone (Maalox Plus*)  30 ml PO Q4H PRN


   PRN Reason: INDIGESTION


Diphenhydramine HCl (Benadryl Po*)  50 mg PO Q6H PRN


   PRN Reason: Anxiety/EPS


   Last Admin: 10/07/18 23:11 Dose:  50 mg


Divalproex Sodium (Depakote Dr Tab(*))  500 mg PO BID [facility]


   Last Admin: 10/08/18 09:16 Dose:  500 mg


Haloperidol (Haldol Tab*)  5 mg PO Q6H PRN


   PRN Reason: AGITATION


Lorazepam (Ativan Tab(*))  1 mg PO Q6H PRN


   PRN Reason: alcohol withdrawal


Risperidone (Risperdal*)  2 mg PO BEDTIME [facility]


   Last Admin: 10/07/18 23:11 Dose:  2 mg

## 2018-10-09 RX ADMIN — DIVALPROEX SODIUM SCH MG: 500 TABLET, DELAYED RELEASE ORAL at 21:45

## 2018-10-09 RX ADMIN — RISPERIDONE SCH MG: 2 TABLET ORAL at 21:45

## 2018-10-09 RX ADMIN — DIVALPROEX SODIUM SCH MG: 500 TABLET, DELAYED RELEASE ORAL at 08:19

## 2018-10-09 NOTE — PN
Subjective





- Subjective


Date of Service: 10/09/18


Service Type: 38810 Hosp care 15 min low complexity


Subjective: 





Patient was seen by self, discussed with treatment team, chart was reviewed. 

Patient has been compliant with his medications, no reported side effects. 

Patient reports continued improvement in his symptoms of paranoia and less 

religiously preoccupied and more organized with his thinking and behavior. 

Patient sleeping has been improving as well. Patient eating has been fair. 

Patient has been cooperative with staff. Patient behavior has been in control. 

Patient mood was less anxious and was acceptive of returning back to [place] 

to his mother and showed reasonable understanding. Patient has been reporting 

no suicidal or homicidal ideation. No psychotic symptoms of hallucinations.





Objective





- Appearance


Appearance: Healthy Appearing


Dysmorphic Features: No


Hygiene: Normal


Grooming: Fairly Well Kept





- Behavior


Psychomotor Activities: Normal





- Attitude and Relatedness


Attitude and Relatedness: Cooperative


Eye Contact: Fair





- Speech


Quality: Unpressured


Latencies: Normal


Quantity: Appropriate





- Mood


Patient's Decription of Mood: "Anxious"





- Affect


Observed Affect: Fair


Affect Consistent with: Dysphoria - less





- Thought Process


Patient's Thought Process: Goal Directed


Thought Content: No Passive Death Wish, No Suicidal Planning, No Homicidal 

Ideation, No Paranoid Ideation





- Sensorium


Experiencing Hallucinations: No, Sensorium is Clear


Type of Hallucinations: Visual: No, Auditory: No, Command: No





- Level of Consciousness


Orientation: Yes Intact, Yes Orientated to Time, Yes Orientated to Place, Yes 

Orientated to Person





- Impulse Control


Impulse Control: Intact





- Insight and Judgement


Insight and Judgement: Fair





- Group Participation


Particating in Group Activities: Yes





- Medication Management


Medication Management Adherence: Yes





Assessment





- Assessment


Merits Inpatient Hospitalization: For Immediate Safety, For Stabilization, For 

Discharge Planning


Inpatient DSM-V Dx: F25.8


Clinical Impression: 





Patient with history of Schizoaffective Disorder and Substance Abuse. Patient 

currently admitted due to worsening of psychosis and mood instability with 

disorganized behavior. Patient has also struggled with substance abuse and 

consuming cannabis and alcohol on a daily basis and was positive for cocaine in 

his urine. Patient is a danger to self and others if discharged hence will be 

stabilized on inpatient unit with medication adjustments and therapy.





Plan





- Plan


Treatment Plan: 


Name: ALTAGRACIA VUONG JR                        


YOB: 1980                        


[id number]


[id number]








- Patient continues to be hospitalized due to recent psychosis, mood instability

, anxiety and disorganized behavior.


- Patient's medications were continued and will be observed and discharge 

planning and arrangements made for tomorrow if no change in mental status.


- Patient will be monitored for improvement and side effects. Risk and benefits 

were discussed.


- Patient was encouraged to continue his participation in the milieu, group and 

individual therapy.


Medications: 


 Current Medications





Acetaminophen (Tylenol Tab*)  650 mg PO Q4H PRN


   PRN Reason: for pain; or Temp >101 F


Al Hydrox/Mg Hydrox/Simethicone (Maalox Plus*)  30 ml PO Q4H PRN


   PRN Reason: INDIGESTION


Diphenhydramine HCl (Benadryl Po*)  50 mg PO Q6H PRN


   PRN Reason: Anxiety/EPS


   Last Admin: 10/07/18 23:11 Dose:  50 mg


Divalproex Sodium (Depakote Dr Tab(*))  500 mg PO BID On license of [facility]


   Last Admin: 10/09/18 08:19 Dose:  500 mg


Haloperidol (Haldol Tab*)  5 mg PO Q6H PRN


   PRN Reason: AGITATION


Lorazepam (Ativan Tab(*))  1 mg PO Q6H PRN


   PRN Reason: alcohol withdrawal


Risperidone (Risperdal*)  2 mg PO BEDTIME On license of [facility]


   Last Admin: 10/08/18 22:42 Dose:  2 mg

## 2018-10-10 VITALS — SYSTOLIC BLOOD PRESSURE: 104 MMHG | DIASTOLIC BLOOD PRESSURE: 75 MMHG

## 2018-10-10 RX ADMIN — DIVALPROEX SODIUM SCH MG: 500 TABLET, DELAYED RELEASE ORAL at 08:29
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
